# Patient Record
Sex: FEMALE | Race: BLACK OR AFRICAN AMERICAN | NOT HISPANIC OR LATINO | URBAN - METROPOLITAN AREA
[De-identification: names, ages, dates, MRNs, and addresses within clinical notes are randomized per-mention and may not be internally consistent; named-entity substitution may affect disease eponyms.]

---

## 2019-12-31 NOTE — PATIENT DISCUSSION
ZULEIKA/K SICCA, OU  - PRESCRIBED ARTIFICIAL TEARS BID - QID, OU AND THE DAILY INTAKE OF OMEGA 3/FATTY ACIDS. CONSIDER OTHER TREATMENT OPTIONS IF SYMPTOMS PERSIST/WORSEN. FOLLOW.

## 2019-12-31 NOTE — PATIENT DISCUSSION
Dry Eye Syndrome (ZULEIKA) Counseling: Dry Eye Syndrome, also known as Keratoconjunctivitis Sicca, is a common condition that occurs when your tears are not able to provide adequate lubrication for your eyes. Symptoms can be exacerbated by environmental factors such as smoke, wind, or prolonged eye use. Treatment options include, but are not limited to, artificial tears, punctal plugs, Restasis, oral omega-3 supplements, and lubricating ointments. I have explained to the patient that successful management is dependent on patient compliance with treatment as prescribed and/or the treatment regimen.

## 2021-04-08 NOTE — PATIENT DISCUSSION
ZULEIKA/K SICCA, OU- PRESCRIBED ARTIFICIAL TEARS BID - QID, OU AND THE DAILY INTAKE OF OMEGA 3/FATTY ACIDS. CONSIDER OTHER TREATMENT OPTIONS IF SYMPTOMS PERSIST/WORSEN. FOLLOW.

## 2022-06-18 RX ORDER — CARVEDILOL 6.25 MG/1
TABLET ORAL
COMMUNITY
Start: 2018-09-20 | End: 2022-10-10 | Stop reason: DRUGHIGH

## 2022-06-28 RX ORDER — LANOLIN ALCOHOL/MO/W.PET/CERES
CREAM (GRAM) TOPICAL
COMMUNITY
Start: 2020-01-28 | End: 2022-09-06

## 2022-06-28 RX ORDER — SIMVASTATIN 40 MG
TABLET ORAL
COMMUNITY
Start: 2017-07-17 | End: 2022-10-06

## 2022-06-28 RX ORDER — HYDROCHLOROTHIAZIDE 25 MG/1
TABLET ORAL
COMMUNITY
Start: 2020-03-23 | End: 2022-11-03 | Stop reason: SDUPTHER

## 2022-06-28 RX ORDER — FAMOTIDINE 20 MG/1
TABLET, FILM COATED ORAL
COMMUNITY
End: 2022-11-03 | Stop reason: SDUPTHER

## 2022-06-28 RX ORDER — TIZANIDINE 4 MG/1
TABLET ORAL
COMMUNITY
End: 2022-09-06

## 2022-09-06 PROBLEM — M25.9 DISORDER OF SHOULDER: Status: ACTIVE | Noted: 2022-09-06

## 2022-09-06 PROBLEM — R42 VERTIGO: Status: ACTIVE | Noted: 2022-09-06

## 2022-09-06 PROBLEM — I05.9 MITRAL VALVE DISORDER: Status: ACTIVE | Noted: 2022-09-06

## 2022-09-06 PROBLEM — N18.30 STAGE 3 CHRONIC KIDNEY DISEASE (HCC): Status: ACTIVE | Noted: 2022-09-06

## 2022-09-06 PROBLEM — J30.9 ALLERGIC RHINITIS: Status: ACTIVE | Noted: 2022-09-06

## 2022-09-06 PROBLEM — M47.812 CERVICAL SPONDYLOSIS: Status: ACTIVE | Noted: 2022-09-06

## 2022-09-06 PROBLEM — I34.0 MITRAL REGURGITATION: Status: ACTIVE | Noted: 2022-09-06

## 2022-09-06 PROBLEM — I10 HYPERTENSION: Status: ACTIVE | Noted: 2022-09-06

## 2022-09-06 PROBLEM — M25.569 PAIN IN JOINT, LOWER LEG: Status: ACTIVE | Noted: 2022-09-06

## 2022-09-06 PROBLEM — M67.912 DYSFUNCTION OF LEFT ROTATOR CUFF: Status: ACTIVE | Noted: 2022-09-06

## 2022-09-06 PROBLEM — Z96.651 STATUS POST TOTAL RIGHT KNEE REPLACEMENT: Status: ACTIVE | Noted: 2022-09-06

## 2022-09-06 PROBLEM — S44.91XD: Status: ACTIVE | Noted: 2022-09-06

## 2022-09-06 PROBLEM — M12.812 ROTATOR CUFF ARTHROPATHY OF LEFT SHOULDER: Status: ACTIVE | Noted: 2022-09-06

## 2022-09-06 PROBLEM — S44.8X2A: Status: ACTIVE | Noted: 2022-09-06

## 2022-09-06 PROBLEM — S63.253A: Status: ACTIVE | Noted: 2022-09-06

## 2022-09-06 PROBLEM — M75.42 SUBACROMIAL IMPINGEMENT OF LEFT SHOULDER: Status: ACTIVE | Noted: 2022-09-06

## 2022-09-06 PROBLEM — M18.10 DEGENERATIVE ARTHRITIS OF THUMB: Status: ACTIVE | Noted: 2022-09-06

## 2022-09-06 PROBLEM — R01.1 SYSTOLIC MURMUR: Status: ACTIVE | Noted: 2022-09-06

## 2022-09-06 PROBLEM — M62.50 DISUSE MUSCLE ATROPHY: Status: ACTIVE | Noted: 2022-09-06

## 2022-09-06 PROBLEM — E78.5 HYPERLIPIDEMIA: Status: ACTIVE | Noted: 2022-09-06

## 2022-09-06 PROBLEM — S44.31XS: Status: ACTIVE | Noted: 2022-09-06

## 2022-09-06 PROBLEM — M62.50 MUSCULAR ATROPHY: Status: ACTIVE | Noted: 2022-09-06

## 2022-09-06 PROBLEM — M54.30 SCIATICA: Status: ACTIVE | Noted: 2022-09-06

## 2022-09-06 PROBLEM — Z96.60 HISTORY OF JOINT REPLACEMENT: Status: ACTIVE | Noted: 2022-09-06

## 2022-09-06 PROBLEM — M67.40 GANGLION CYST: Status: ACTIVE | Noted: 2022-09-06

## 2022-09-06 PROBLEM — M76.62 ACHILLES TENDINITIS OF LEFT LOWER EXTREMITY: Status: ACTIVE | Noted: 2022-09-06

## 2022-09-06 PROBLEM — M25.519 SHOULDER PAIN: Status: ACTIVE | Noted: 2022-09-06

## 2022-09-06 PROBLEM — M54.10 RADICULOPATHY: Status: ACTIVE | Noted: 2022-09-06

## 2022-09-06 PROBLEM — N95.9 MENOPAUSAL AND POSTMENOPAUSAL DISORDER: Status: ACTIVE | Noted: 2022-09-06

## 2022-09-06 PROBLEM — M19.042 PRIMARY OSTEOARTHRITIS, LEFT HAND: Status: ACTIVE | Noted: 2022-09-06

## 2022-09-06 PROBLEM — M47.22 CERVICAL SPONDYLOSIS WITH RADICULOPATHY: Status: ACTIVE | Noted: 2022-09-06

## 2022-09-06 PROBLEM — M25.551 RIGHT HIP PAIN: Status: ACTIVE | Noted: 2022-09-06

## 2022-09-06 PROBLEM — E11.9 TYPE 2 DIABETES MELLITUS (HCC): Status: ACTIVE | Noted: 2022-09-06

## 2022-09-06 PROBLEM — Z96.611 STATUS POST REVERSE TOTAL ARTHROPLASTY OF RIGHT SHOULDER: Status: ACTIVE | Noted: 2022-09-06

## 2022-09-06 PROBLEM — N18.32 CKD STAGE G3B/A1, GFR 30-44 AND ALBUMIN CREATININE RATIO <30 MG/G (HCC): Status: ACTIVE | Noted: 2022-09-06

## 2022-09-06 PROBLEM — R89.9 ABNORMAL LABORATORY TEST RESULT: Status: ACTIVE | Noted: 2022-09-06

## 2022-09-06 PROBLEM — E78.2 MIXED HYPERLIPIDEMIA: Status: ACTIVE | Noted: 2022-09-06

## 2022-09-06 PROBLEM — M54.2 CERVICALGIA: Status: ACTIVE | Noted: 2022-09-06

## 2022-09-06 PROBLEM — M17.9 OSTEOARTHRITIS OF KNEE: Status: ACTIVE | Noted: 2022-09-06

## 2022-09-06 PROBLEM — K21.9 GASTROESOPHAGEAL REFLUX DISEASE: Status: ACTIVE | Noted: 2022-09-06

## 2022-09-06 PROBLEM — M25.522 LEFT ELBOW PAIN: Status: ACTIVE | Noted: 2022-09-06

## 2022-09-06 PROBLEM — M62.512 ATROPHY OF MUSCLE OF LEFT SHOULDER: Status: ACTIVE | Noted: 2022-09-06

## 2022-09-06 PROBLEM — K21.00 REFLUX ESOPHAGITIS: Status: ACTIVE | Noted: 2022-09-06

## 2022-09-15 ENCOUNTER — COMPREHENSIVE EXAM (OUTPATIENT)
Dept: URBAN - METROPOLITAN AREA CLINIC 16 | Facility: CLINIC | Age: 87
End: 2022-09-15

## 2022-09-15 DIAGNOSIS — H52.223: ICD-10-CM

## 2022-09-15 DIAGNOSIS — H52.4: ICD-10-CM

## 2022-09-15 DIAGNOSIS — H40.013: ICD-10-CM

## 2022-09-15 DIAGNOSIS — H52.03: ICD-10-CM

## 2022-09-15 DIAGNOSIS — Z96.1: ICD-10-CM

## 2022-09-15 PROCEDURE — 92014 COMPRE OPH EXAM EST PT 1/>: CPT

## 2022-09-15 PROCEDURE — 92133 CPTRZD OPH DX IMG PST SGM ON: CPT

## 2022-09-15 PROCEDURE — 92015 DETERMINE REFRACTIVE STATE: CPT

## 2022-09-15 ASSESSMENT — VISUAL ACUITY
OD_SC: 20/20-1
OS_SC: 20/40+1
OU_SC: 20/25-1

## 2022-09-15 ASSESSMENT — TONOMETRY
OS_IOP_MMHG: 13
OD_IOP_MMHG: 11

## 2022-09-15 ASSESSMENT — KERATOMETRY
OD_AXISANGLE_DEGREES: 76
OD_AXISANGLE2_DEGREES: 166
OD_K1POWER_DIOPTERS: 43.75
OS_K1POWER_DIOPTERS: 44.00
OS_K2POWER_DIOPTERS: 45.25
OS_AXISANGLE_DEGREES: 100
OS_AXISANGLE2_DEGREES: 10
OD_K2POWER_DIOPTERS: 44.50

## 2022-10-06 PROBLEM — Z86.73 HISTORY OF TRANSIENT ISCHEMIC ATTACK: Status: ACTIVE | Noted: 2019-10-23

## 2022-11-03 PROBLEM — N18.4 CKD (CHRONIC KIDNEY DISEASE) STAGE 4, GFR 15-29 ML/MIN (HCC): Status: ACTIVE | Noted: 2022-11-03

## 2023-09-19 ASSESSMENT — KERATOMETRY
OD_AXISANGLE_DEGREES: 76
OS_AXISANGLE_DEGREES: 100
OD_K1POWER_DIOPTERS: 43.75
OD_K2POWER_DIOPTERS: 44.50
OS_K2POWER_DIOPTERS: 45.25
OS_K1POWER_DIOPTERS: 44.00
OS_AXISANGLE2_DEGREES: 10
OD_AXISANGLE2_DEGREES: 166

## 2023-09-26 ENCOUNTER — COMPREHENSIVE EXAM (OUTPATIENT)
Dept: URBAN - METROPOLITAN AREA CLINIC 16 | Facility: CLINIC | Age: 88
End: 2023-09-26

## 2023-09-26 DIAGNOSIS — H52.4: ICD-10-CM

## 2023-09-26 DIAGNOSIS — H52.223: ICD-10-CM

## 2023-09-26 DIAGNOSIS — Z96.1: ICD-10-CM

## 2023-09-26 DIAGNOSIS — Z01.00: ICD-10-CM

## 2023-09-26 DIAGNOSIS — H52.03: ICD-10-CM

## 2023-09-26 DIAGNOSIS — H40.013: ICD-10-CM

## 2023-09-26 PROCEDURE — 92014 COMPRE OPH EXAM EST PT 1/>: CPT

## 2023-09-26 PROCEDURE — 92015 DETERMINE REFRACTIVE STATE: CPT

## 2023-09-26 ASSESSMENT — TONOMETRY
OD_IOP_MMHG: 13
OS_IOP_MMHG: 13

## 2023-09-26 ASSESSMENT — VISUAL ACUITY
OD_SC: 20/20-1
OS_SC: 20/40-2

## 2024-09-16 ENCOUNTER — COMPREHENSIVE EXAM (OUTPATIENT)
Dept: URBAN - METROPOLITAN AREA CLINIC 16 | Facility: CLINIC | Age: 89
End: 2024-09-16

## 2024-09-16 DIAGNOSIS — H52.4: ICD-10-CM

## 2024-09-16 DIAGNOSIS — Z01.00: ICD-10-CM

## 2024-09-16 DIAGNOSIS — H52.03: ICD-10-CM

## 2024-09-16 DIAGNOSIS — Z96.1: ICD-10-CM

## 2024-09-16 DIAGNOSIS — H52.223: ICD-10-CM

## 2024-09-16 DIAGNOSIS — H40.013: ICD-10-CM

## 2024-09-16 PROCEDURE — 92015 DETERMINE REFRACTIVE STATE: CPT

## 2024-09-16 PROCEDURE — 92014 COMPRE OPH EXAM EST PT 1/>: CPT
